# Patient Record
Sex: FEMALE | Race: WHITE | NOT HISPANIC OR LATINO | URBAN - METROPOLITAN AREA
[De-identification: names, ages, dates, MRNs, and addresses within clinical notes are randomized per-mention and may not be internally consistent; named-entity substitution may affect disease eponyms.]

---

## 2023-08-08 LAB
CLUE CELLS: NORMAL
NUGENT SCORE: 2
YEAST: NORMAL

## 2023-10-27 ENCOUNTER — OFFICE VISIT (OUTPATIENT)
Dept: OBSTETRICS AND GYNECOLOGY | Facility: CLINIC | Age: 48
End: 2023-10-27
Payer: MEDICARE

## 2023-10-27 VITALS — BODY MASS INDEX: 31.93 KG/M2 | HEIGHT: 62 IN

## 2023-10-27 DIAGNOSIS — N76.0 ACUTE VAGINITIS: ICD-10-CM

## 2023-10-27 PROCEDURE — 99214 OFFICE O/P EST MOD 30 MIN: CPT | Performed by: OBSTETRICS & GYNECOLOGY

## 2023-10-27 PROCEDURE — 1036F TOBACCO NON-USER: CPT | Performed by: OBSTETRICS & GYNECOLOGY

## 2023-10-27 PROCEDURE — 87205 SMEAR GRAM STAIN: CPT

## 2023-10-27 RX ORDER — ASPIRIN 81 MG/1
81 TABLET ORAL
COMMUNITY
Start: 2019-04-02

## 2023-10-27 RX ORDER — TACROLIMUS 1 MG/1
CAPSULE ORAL
COMMUNITY
Start: 2023-08-07

## 2023-10-27 RX ORDER — CLOTRIMAZOLE AND BETAMETHASONE DIPROPIONATE 10; .64 MG/G; MG/G
CREAM TOPICAL 2 TIMES DAILY
COMMUNITY
Start: 2021-11-04 | End: 2023-10-27 | Stop reason: SDUPTHER

## 2023-10-27 RX ORDER — FLUCONAZOLE 150 MG/1
150 TABLET ORAL SEE ADMIN INSTRUCTIONS
Qty: 2 TABLET | Refills: 3 | Status: SHIPPED | OUTPATIENT
Start: 2023-10-27

## 2023-10-27 RX ORDER — PANTOPRAZOLE SODIUM 40 MG/1
40 TABLET, DELAYED RELEASE ORAL 2 TIMES DAILY
COMMUNITY
Start: 2023-05-16

## 2023-10-27 RX ORDER — CLINDAMYCIN PHOSPHATE 100 MG/5G
CREAM VAGINAL
COMMUNITY
Start: 2023-01-11 | End: 2024-01-08 | Stop reason: SDUPTHER

## 2023-10-27 RX ORDER — DIAZEPAM 10 MG/1
10 TABLET ORAL EVERY 12 HOURS PRN
COMMUNITY

## 2023-10-27 RX ORDER — CLOTRIMAZOLE AND BETAMETHASONE DIPROPIONATE 10; .64 MG/G; MG/G
CREAM TOPICAL 2 TIMES DAILY
Qty: 30 G | Refills: 2 | Status: SHIPPED | OUTPATIENT
Start: 2023-10-27

## 2023-10-27 NOTE — PROGRESS NOTES
Patient presents for recent episode of vaginal vulvar itching and irritation.  No vaginal odor.  In addition patient wants to know if perimenopausal symptoms could increase anxiety.  Discussed.      REVIEW OF SYSTEMS    Please see HPI for reported pertinent positives, which would supersede this ROS    Constitutional:  Denies fever, chills, wt gain or loss, fatigue    Genito-Urinary:  Denies genital lesion or sores, vaginal dryness, itching  or pain.  No abnormal vaginal discharge or unexplained vaginal bleeding.  No dysuria, urinary incontinence or frequency.  Denies pelvic pain, dysmenorrhea or dyspareunia.    Eyes:  Denies vision changes, dryness  ENT:  No hearing loss, sinus pain or congestion, nosebleeds  Cardiovascular:  No chest pain or palpitations  Respiratory:  No SOB, cough, wheezing  GI:  No Nausea, vomiting, diarrhea, constipation, abdominal pain  Musculoskeletal:  No new back pain. joint pain, peripheral edema  Skin:  No rash or skin lesion  Neurologic:  No HA, numbness or dizziness  Psychiatric:  No new anxiety or depression  Endocrine:  No loss of hair or hirsutism  Hematologic/lymphatic:  No swollen lymph nodes.  No reported tendency for easy bruising or bleeding    Patient admits to no other systemic complaints      PHYSICAL EXAM      PSYCH:  Pt is alert, oriented and cooperative    SKIN: warm, dry, w/o lesion    HEAD AND FACE:  External inspection of eyes, ears, functional cranial nerves normal and intact    THYROID:  No thyromegaly    CARDIOVASCULAR:  Warm and well Perfused    PULMONARY:  No respiratory distress    ABDOMEN:  soft, nontender.  No mass or organomegaly.      PELVIC:    External genitalia, urethra, perianal region normal to inspection and palpation if indicated.  No inguinal LA  Mild erythema inner labia minora consistent with yeast vulvitis.    Assessment/Plan    Diagnoses and all orders for this visit:  Acute vaginitis  -     clotrimazole-betamethasone (Lotrisone) cream; Apply  topically 2 times a day.  -     fluconazole (Diflucan) 150 mg tablet; Take 1 tablet (150 mg) by mouth see administration instructions for 2 doses. Take one tab now and repeat in 3 days

## 2023-10-28 LAB
CLUE CELLS VAG LPF-#/AREA: NORMAL /[LPF]
NUGENT SCORE: 2
YEAST VAG WET PREP-#/AREA: NORMAL

## 2023-10-30 ENCOUNTER — TELEPHONE (OUTPATIENT)
Dept: OBSTETRICS AND GYNECOLOGY | Facility: CLINIC | Age: 48
End: 2023-10-30
Payer: MEDICARE

## 2023-10-30 DIAGNOSIS — N76.0 ACUTE VAGINITIS: Primary | ICD-10-CM

## 2023-10-30 RX ORDER — TERCONAZOLE 8 MG/G
1 CREAM VAGINAL NIGHTLY
Qty: 20 G | Refills: 2 | Status: SHIPPED | OUTPATIENT
Start: 2023-10-30 | End: 2023-11-02

## 2023-10-30 NOTE — TELEPHONE ENCOUNTER
Patient called regarding results, I informed her all were negative. Patient then states she'd like to speak with you regarding two things:    Results - why the symptoms disappeared once she took the diflucan if she didn't have a yeast infection, is it possible she had one and the results didn't show?  She experienced severe, all over body, itching after taking diflucan and would like to know the alternatives if she can't take this anymore.     Please call.

## 2024-01-03 ENCOUNTER — TELEPHONE (OUTPATIENT)
Dept: OBSTETRICS AND GYNECOLOGY | Facility: CLINIC | Age: 49
End: 2024-01-03
Payer: MEDICARE

## 2024-01-03 DIAGNOSIS — N76.0 ACUTE VAGINITIS: Primary | ICD-10-CM

## 2024-01-03 RX ORDER — TERCONAZOLE 4 MG/G
1 CREAM VAGINAL NIGHTLY
Qty: 45 G | Refills: 3 | Status: SHIPPED | OUTPATIENT
Start: 2024-01-03 | End: 2024-01-10

## 2024-01-03 NOTE — TELEPHONE ENCOUNTER
Patient states she is on day 3 of terconazole cream and is starting to feel a little better, however, she is now experiencing some bleeding and swollen vulva.  Patient has Rx of terconazole to  from pharmacy for another 3 day course, however, she would like to know if you would like to see her in the office since it is not resolved or if she should have a full 7 day course of terconazole.  She has the medication for BV, however, does not want to take at this time because she does not feel those are the symptoms she is having.  Please advise.    Mary Brarientos MA

## 2024-01-04 ENCOUNTER — TELEPHONE (OUTPATIENT)
Dept: OBSTETRICS AND GYNECOLOGY | Facility: CLINIC | Age: 49
End: 2024-01-04
Payer: MEDICARE

## 2024-01-04 NOTE — TELEPHONE ENCOUNTER
Patient called stating she is using the medication but now thinks it may be   BV, she is swollen,itchy and has an odor. She states she is also bleeding  But is not sure if that is her period starting and would like to come  In for an appointment on Friday. Please advise.

## 2024-01-04 NOTE — TELEPHONE ENCOUNTER
Can you please call the patient back I scheduled her for Monday but  She thinks it may be yeast but is not sure. She did a dose of Clyndes  Last night and wants to know how long it takes to work and if she should do another  Dose she also did 4 doses of kiet cream and is not getting any relief.

## 2024-01-08 ENCOUNTER — APPOINTMENT (OUTPATIENT)
Dept: OBSTETRICS AND GYNECOLOGY | Facility: CLINIC | Age: 49
End: 2024-01-08
Payer: MEDICARE

## 2024-01-08 ENCOUNTER — TELEPHONE (OUTPATIENT)
Dept: OBSTETRICS AND GYNECOLOGY | Facility: CLINIC | Age: 49
End: 2024-01-08

## 2024-01-08 DIAGNOSIS — N76.1 CHRONIC VAGINITIS: Primary | ICD-10-CM

## 2024-01-08 RX ORDER — TERCONAZOLE 8 MG/G
1 CREAM VAGINAL NIGHTLY
Qty: 20 G | Refills: 3 | Status: SHIPPED | OUTPATIENT
Start: 2024-01-08 | End: 2024-01-11

## 2024-01-08 RX ORDER — CLINDAMYCIN PHOSPHATE 100 MG/5G
1 CREAM VAGINAL NIGHTLY
Qty: 5.8 G | Refills: 3 | Status: SHIPPED | OUTPATIENT
Start: 2024-01-08

## 2024-02-26 ENCOUNTER — TELEPHONE (OUTPATIENT)
Dept: OBSTETRICS AND GYNECOLOGY | Facility: CLINIC | Age: 49
End: 2024-02-26
Payer: MEDICARE

## 2024-02-26 DIAGNOSIS — Z12.31 BREAST CANCER SCREENING BY MAMMOGRAM: Primary | ICD-10-CM

## 2024-02-26 NOTE — TELEPHONE ENCOUNTER
Patient has an appointment for her mammogram next week and is asking for an order because she states she has one but it has . If you need to speak with her she can be reached at [phone], thank you.

## 2024-11-06 ENCOUNTER — TELEPHONE (OUTPATIENT)
Dept: OBSTETRICS AND GYNECOLOGY | Facility: CLINIC | Age: 49
End: 2024-11-06
Payer: MEDICARE

## 2024-11-06 NOTE — TELEPHONE ENCOUNTER
Patient needs a refill on Clindesse 2% vaginal cream  Tercanozole vaginal cream  Lotrisone cream  Send to pharmacy on file  Overdue for annual but states you told her to come every 3 years.      Patient does the 3 days Tercanozole

## 2024-11-07 DIAGNOSIS — N76.0 ACUTE VAGINITIS: ICD-10-CM

## 2024-11-07 DIAGNOSIS — N76.1 CHRONIC VAGINITIS: Primary | ICD-10-CM

## 2024-11-07 DIAGNOSIS — N76.1 CHRONIC VAGINITIS: ICD-10-CM

## 2024-11-07 RX ORDER — CLOTRIMAZOLE AND BETAMETHASONE DIPROPIONATE 10; .64 MG/G; MG/G
CREAM TOPICAL 2 TIMES DAILY
Qty: 30 G | Refills: 3 | Status: SHIPPED | OUTPATIENT
Start: 2024-11-07

## 2024-11-07 RX ORDER — CLINDAMYCIN PHOSPHATE 100 MG/5G
1 CREAM VAGINAL NIGHTLY
Qty: 5.8 G | Refills: 3 | Status: SHIPPED | OUTPATIENT
Start: 2024-11-07

## 2024-11-07 RX ORDER — TERCONAZOLE 8 MG/G
1 CREAM VAGINAL NIGHTLY
Qty: 20 G | Refills: 5 | Status: SHIPPED | OUTPATIENT
Start: 2024-11-07 | End: 2024-11-10

## 2024-12-05 ENCOUNTER — TELEPHONE (OUTPATIENT)
Dept: OBSTETRICS AND GYNECOLOGY | Facility: CLINIC | Age: 49
End: 2024-12-05
Payer: MEDICARE

## 2024-12-05 DIAGNOSIS — N76.0 ACUTE VAGINITIS: Primary | ICD-10-CM

## 2024-12-05 NOTE — TELEPHONE ENCOUNTER
Patient states she has a pretty bad yeast infection.  She used terconazole for 3 days with little relief.  Patient states she works out hard daily and is not sure if that is why it is not going away.  Would like to know if she should treat for  another 3 days with the terconazole or if you would need to call in something stronger such as gynazole 1?  Patient is scheduled to see you on Monday , however, she is very miserable and would like to try something before then.    Mary Barrientos MA

## 2024-12-09 ENCOUNTER — APPOINTMENT (OUTPATIENT)
Dept: OBSTETRICS AND GYNECOLOGY | Facility: CLINIC | Age: 49
End: 2024-12-09
Payer: MEDICARE

## 2024-12-17 ENCOUNTER — TELEPHONE (OUTPATIENT)
Dept: OBSTETRICS AND GYNECOLOGY | Facility: CLINIC | Age: 49
End: 2024-12-17
Payer: MEDICARE

## 2024-12-17 NOTE — TELEPHONE ENCOUNTER
Patient states she needs to speak to you about the ongoing yeast infection and the medication she has that is not helping very much.  Patient states after she speaks to you then you can determine if she needs to repeat a dose of something or if she needs to come in and be seen.    Mary Barrientos MA

## 2024-12-18 DIAGNOSIS — N76.1 CHRONIC VAGINITIS: Primary | ICD-10-CM

## 2024-12-18 RX ORDER — TERCONAZOLE 4 MG/G
CREAM VAGINAL
Qty: 45 G | Refills: 3 | Status: SHIPPED | OUTPATIENT
Start: 2024-12-18

## 2024-12-18 RX ORDER — TERCONAZOLE 8 MG/G
1 CREAM VAGINAL NIGHTLY
Qty: 20 G | Refills: 6 | Status: SHIPPED | OUTPATIENT
Start: 2024-12-18 | End: 2024-12-21

## 2025-02-10 ENCOUNTER — APPOINTMENT (OUTPATIENT)
Dept: OBSTETRICS AND GYNECOLOGY | Facility: CLINIC | Age: 50
End: 2025-02-10
Payer: MEDICARE

## 2025-02-17 ENCOUNTER — APPOINTMENT (OUTPATIENT)
Dept: OBSTETRICS AND GYNECOLOGY | Facility: CLINIC | Age: 50
End: 2025-02-17
Payer: MEDICARE

## 2025-04-07 ENCOUNTER — APPOINTMENT (OUTPATIENT)
Dept: OBSTETRICS AND GYNECOLOGY | Facility: CLINIC | Age: 50
End: 2025-04-07
Payer: MEDICARE

## 2025-04-07 VITALS — BODY MASS INDEX: 25.03 KG/M2 | WEIGHT: 136 LBS | HEIGHT: 62 IN

## 2025-04-07 DIAGNOSIS — Z12.31 BREAST CANCER SCREENING BY MAMMOGRAM: ICD-10-CM

## 2025-04-07 DIAGNOSIS — N76.0 ACUTE VAGINITIS: ICD-10-CM

## 2025-04-07 DIAGNOSIS — Z01.419 WELL WOMAN EXAM WITH ROUTINE GYNECOLOGICAL EXAM: Primary | ICD-10-CM

## 2025-04-07 PROBLEM — F41.9 ANXIETY: Status: ACTIVE | Noted: 2025-04-07

## 2025-04-07 PROBLEM — B96.89 BV (BACTERIAL VAGINOSIS): Status: RESOLVED | Noted: 2025-04-07 | Resolved: 2025-04-07

## 2025-04-07 PROBLEM — I21.4 MI, ACUTE, NON ST SEGMENT ELEVATION (MULTI): Status: RESOLVED | Noted: 2019-03-29 | Resolved: 2025-04-07

## 2025-04-07 PROBLEM — K57.30 DIVERTICULOSIS OF COLON: Status: RESOLVED | Noted: 2025-04-07 | Resolved: 2025-04-07

## 2025-04-07 PROBLEM — K44.9 HIATAL HERNIA WITH GERD: Status: RESOLVED | Noted: 2025-04-07 | Resolved: 2025-04-07

## 2025-04-07 PROBLEM — R00.1 BRADYCARDIA: Status: ACTIVE | Noted: 2022-03-14

## 2025-04-07 PROBLEM — K59.09 CHRONIC CONSTIPATION: Status: RESOLVED | Noted: 2025-04-07 | Resolved: 2025-04-07

## 2025-04-07 PROBLEM — K58.9 IRRITABLE BOWEL SYNDROME: Status: RESOLVED | Noted: 2025-04-07 | Resolved: 2025-04-07

## 2025-04-07 PROBLEM — E66.811 OBESITY, CLASS I, BMI 30-34.9: Status: RESOLVED | Noted: 2023-04-24 | Resolved: 2025-04-07

## 2025-04-07 PROBLEM — R92.8 ABNORMAL MAMMOGRAM: Status: RESOLVED | Noted: 2025-04-07 | Resolved: 2025-04-07

## 2025-04-07 PROBLEM — I21.3 STEMI (ST ELEVATION MYOCARDIAL INFARCTION) (MULTI): Status: RESOLVED | Noted: 2023-04-22 | Resolved: 2025-04-07

## 2025-04-07 PROBLEM — N76.4 VULVAR ABSCESS: Status: RESOLVED | Noted: 2025-04-07 | Resolved: 2025-04-07

## 2025-04-07 PROBLEM — N76.2 ACUTE VULVITIS: Status: RESOLVED | Noted: 2025-04-07 | Resolved: 2025-04-07

## 2025-04-07 PROBLEM — I51.81 TAKOTSUBO SYNDROME: Status: ACTIVE | Noted: 2023-04-22

## 2025-04-07 PROBLEM — R07.89 OTHER CHEST PAIN: Status: RESOLVED | Noted: 2019-04-01 | Resolved: 2025-04-07

## 2025-04-07 PROBLEM — K21.9 GERD (GASTROESOPHAGEAL REFLUX DISEASE): Status: RESOLVED | Noted: 2025-04-07 | Resolved: 2025-04-07

## 2025-04-07 PROBLEM — K21.9 HIATAL HERNIA WITH GERD: Status: RESOLVED | Noted: 2025-04-07 | Resolved: 2025-04-07

## 2025-04-07 PROCEDURE — 87661 TRICHOMONAS VAGINALIS AMPLIF: CPT

## 2025-04-07 PROCEDURE — 87591 N.GONORRHOEAE DNA AMP PROB: CPT

## 2025-04-07 PROCEDURE — 1036F TOBACCO NON-USER: CPT | Performed by: OBSTETRICS & GYNECOLOGY

## 2025-04-07 PROCEDURE — 3008F BODY MASS INDEX DOCD: CPT | Performed by: OBSTETRICS & GYNECOLOGY

## 2025-04-07 PROCEDURE — 99396 PREV VISIT EST AGE 40-64: CPT | Performed by: OBSTETRICS & GYNECOLOGY

## 2025-04-07 PROCEDURE — 87491 CHLMYD TRACH DNA AMP PROBE: CPT

## 2025-04-07 RX ORDER — TERCONAZOLE 8 MG/G
CREAM VAGINAL
COMMUNITY
Start: 2025-04-06

## 2025-04-07 RX ORDER — CLOTRIMAZOLE AND BETAMETHASONE DIPROPIONATE 10; .64 MG/G; MG/G
CREAM TOPICAL 2 TIMES DAILY
Qty: 30 G | Refills: 3 | Status: SHIPPED | OUTPATIENT
Start: 2025-04-07

## 2025-04-07 RX ORDER — FLUCONAZOLE 150 MG/1
150 TABLET ORAL SEE ADMIN INSTRUCTIONS
Qty: 2 TABLET | Refills: 2 | Status: SHIPPED | OUTPATIENT
Start: 2025-04-07

## 2025-04-07 RX ORDER — TERCONAZOLE 8 MG/G
1 CREAM VAGINAL NIGHTLY
Qty: 20 G | Refills: 3 | Status: SHIPPED | OUTPATIENT
Start: 2025-04-07

## 2025-04-07 ASSESSMENT — PATIENT HEALTH QUESTIONNAIRE - PHQ9
1. LITTLE INTEREST OR PLEASURE IN DOING THINGS: NOT AT ALL
2. FEELING DOWN, DEPRESSED OR HOPELESS: NOT AT ALL
SUM OF ALL RESPONSES TO PHQ9 QUESTIONS 1 AND 2: 0

## 2025-04-07 NOTE — PROGRESS NOTES
Chief Complaint   Patient presents with    Annual Exam     Annual Exam with Pap Smear and Order for mammogram    G0    C/O recurring yeast infections x6 months; finished terconazole 0.8% yesterday and is feeling somewhat relieved, however yesterday she still had discharge.  Would like to discuss trying Diflucan as a possible treatment again.   Boric acid does not help.  Works out approx. 2 hours a day, comes home and washes with warm water, changes clothes. Very clean, restrictive diet. No period in February; prior to March LMP was 1/29/25.  Chaperone declined       For prevenattive care.  Also chronic h/o vulvitis - terazol minimal help now.  Disc possible resistant yeast.  Has taken diflucan in past a number of times w/o incident.  Took it once and had localized itching.  Pt would like to try again, and aware allergic reactions can recur or escalate.    Disc concep t of vulvadynia as well as vaginal Estrogen therapy.  Pt reluctant to move in those directions - states sensitive to elavil in the past    REVIEW OF SYSTEMS    Please see HPI for reported pertinent positives, which would supersede this ROS    Constitutional:  Denies fever, chills, wt gain or loss, fatigue    Genito-Urinary:  Denies genital lesion or sores, vaginal dryness, itching  or pain.  No abnormal vaginal discharge or unexplained vaginal bleeding.  No dysuria, urinary incontinence or frequency.  Denies pelvic pain, dysmenorrhea or dyspareunia.    Eyes:  Denies vision changes, dryness  ENT:  No hearing loss, sinus pain or congestion, nosebleeds  Cardiovascular:  No chest pain or palpitations  Respiratory:  No SOB, cough, wheezing  GI:  No Nausea, vomiting, diarrhea, constipation, abdominal pain  Musculoskeletal:  No new back pain. joint pain, peripheral edema  Skin:  No rash or skin lesion  Neurologic:  No HA, numbness or dizziness  Psychiatric:  No new anxiety or depression  Endocrine:  No loss of hair or hirsutism  Hematologic/lymphatic:  No  swollen lymph nodes.  No reported tendency for easy bruising or bleeding    Patient admits to no other systemic complaints      PHYSICAL EXAM:    PSYCH:  Pt is alert, oriented and cooperative    SKIN: warm, dry, w/o lesion    HEAD AND FACE:  External inspection of eyes, ears, functional cranial nerves normal and intact    THYROID:  No thyromegaly    CARDIOVASCULAR:  Warm and well Perfused    PULMONARY:  No respiratory distress    ABDOMEN:  soft, nontender.  No mass or organomegaly.      BREAST:  Breasts are symmetric to inspection and palpation.  No mass palpable bilaterally.  There is no axillary lymphadenopathy    PELVIC:    External genitalia, urethra, perianal region normal to inspection and palpation if indicated.  No inguinal LA    Vagina without lesions.    Cervix seen and visually normal      Bimanual exam:      No pelvic mass palpable    Uterus nontender, midline in pelvis    No adnexal masses or tenderness      Assessment/Plan    Diagnoses and all orders for this visit:  Well woman exam with routine gynecological exam  -     THINPREP PAP TEST  Breast cancer screening by mammogram  -     BI mammo bilateral screening tomosynthesis; Future  Acute vaginitis  -     clotrimazole-betamethasone (Lotrisone) cream; Apply topically 2 times a day.  -     Vaginitis Gram Stain For Bacterial Vaginosis + Yeast  -     fluconazole (Diflucan) 150 mg tablet; Take 1 tablet (150 mg) by mouth see administration instructions for 2 doses. Take one tab now and repeat in 3 days    Pt states has taken before w/o issue  -     terconazole (Terazol 3) 0.8 % vaginal cream; Insert 1 applicator into the vagina once daily at bedtime. Pt has taken before w/o issue

## 2025-04-08 LAB
BV SCORE VAG QL: NORMAL
C TRACH RRNA SPEC QL NAA+PROBE: NEGATIVE
N GONORRHOEA DNA SPEC QL PROBE+SIG AMP: NEGATIVE
T VAGINALIS RRNA SPEC QL NAA+PROBE: NEGATIVE

## 2025-04-09 ENCOUNTER — TELEPHONE (OUTPATIENT)
Dept: OBSTETRICS AND GYNECOLOGY | Facility: CLINIC | Age: 50
End: 2025-04-09
Payer: MEDICARE

## 2025-04-09 DIAGNOSIS — N76.0 ACUTE VAGINITIS: Primary | ICD-10-CM

## 2025-04-09 RX ORDER — CLINDAMYCIN PHOSPHATE 20 MG/G
1 CREAM VAGINAL NIGHTLY
Qty: 40 G | Refills: 0 | Status: SHIPPED | OUTPATIENT
Start: 2025-04-09 | End: 2025-04-12

## 2025-04-09 NOTE — TELEPHONE ENCOUNTER
Patient called she has Clindesse 1 day suppository and wants to know if she can take that instead of what you sent in she said they are the same.

## 2025-04-09 NOTE — TELEPHONE ENCOUNTER
Patient notified of test results and treatment prescribed to treat.    Mary Barrientos MA    ----- Message from Chang Pat sent at 4/9/2025  5:38 AM EDT -----  Please call pt - vaginal swab came back showing  -no yeast   -a shift in bacteria suggesting a desquamative vaginitis - a bacterial inflammatory vaginitis (Not sexually transmitted) treated w cleocin cream.  Prescription is in

## 2025-05-02 LAB
CYTOLOGY CMNT CVX/VAG CYTO-IMP: NORMAL
HPV HR 12 DNA GENITAL QL NAA+PROBE: NEGATIVE
HPV HR GENOTYPES PNL CVX NAA+PROBE: NEGATIVE
HPV16 DNA SPEC QL NAA+PROBE: NEGATIVE
HPV18 DNA SPEC QL NAA+PROBE: NEGATIVE
LAB AP HPV GENOTYPE QUESTION: YES
LAB AP HPV HR: NORMAL
LAB AP PAP ADDITIONAL TESTS: NORMAL
LABORATORY COMMENT REPORT: NORMAL
LMP START DATE: NORMAL
PATH REPORT.TOTAL CANCER: NORMAL

## 2025-05-14 DIAGNOSIS — N76.1 CHRONIC VAGINITIS: ICD-10-CM

## 2025-05-14 RX ORDER — CLINDAMYCIN PHOSPHATE 100 MG/5G
1 CREAM VAGINAL NIGHTLY
Qty: 5.8 G | Refills: 3 | Status: SHIPPED | OUTPATIENT
Start: 2025-05-14

## 2025-05-14 NOTE — TELEPHONE ENCOUNTER
Pharmacy is requesting a refill of Clindesse 2% vaginal cream - patient last seen for annual 04/2025.

## 2025-06-29 DIAGNOSIS — N76.0 ACUTE VAGINITIS: ICD-10-CM

## 2025-06-29 DIAGNOSIS — N76.1 CHRONIC VAGINITIS: ICD-10-CM

## 2025-06-30 RX ORDER — TERCONAZOLE 8 MG/G
CREAM VAGINAL
Qty: 20 G | Refills: 3 | Status: SHIPPED | OUTPATIENT
Start: 2025-06-30

## 2025-06-30 RX ORDER — CLINDAMYCIN PHOSPHATE 100 MG/5G
CREAM VAGINAL
Qty: 5 G | Refills: 3 | Status: SHIPPED | OUTPATIENT
Start: 2025-06-30